# Patient Record
(demographics unavailable — no encounter records)

---

## 2025-01-28 NOTE — HISTORY OF PRESENT ILLNESS
[FreeTextEntry1] : 64yo F in the office for evaluation of finger deformities  History: patient with finger deformities and pain deformities with slow progression over 10 years pain episodic wax-wane physical activity seems to precipitate symptoms deformities over DIP with deviations of these joints no redness no swelling no ttp described  ROS no morning stiffness no synovitis no dactylitis no uveitis history of PSO scalp vs seborrheic dermatitis diagnosed around 5 years ago

## 2025-01-28 NOTE — PHYSICAL EXAM
[General Appearance - Alert] : alert [General Appearance - In No Acute Distress] : in no acute distress [Sclera] : the sclera and conjunctiva were normal [Neck Appearance] : the appearance of the neck was normal [Auscultation Breath Sounds / Voice Sounds] : lungs were clear to auscultation bilaterally [Heart Rate And Rhythm] : heart rate was normal and rhythm regular [Heart Sounds] : normal S1 and S2 [FreeTextEntry1] : notable hypertrophic changes over DIPs along with multiple deviations [] : no rash [No Focal Deficits] : no focal deficits [Oriented To Time, Place, And Person] : oriented to person, place, and time